# Patient Record
Sex: FEMALE | Race: BLACK OR AFRICAN AMERICAN | NOT HISPANIC OR LATINO | URBAN - METROPOLITAN AREA
[De-identification: names, ages, dates, MRNs, and addresses within clinical notes are randomized per-mention and may not be internally consistent; named-entity substitution may affect disease eponyms.]

---

## 2017-03-09 ENCOUNTER — OUTPATIENT (OUTPATIENT)
Dept: OUTPATIENT SERVICES | Facility: HOSPITAL | Age: 37
LOS: 1 days | End: 2017-03-09

## 2017-03-09 ENCOUNTER — EMERGENCY (EMERGENCY)
Facility: HOSPITAL | Age: 37
LOS: 1 days | Discharge: STILL ADMITTED OR ADMINISTRATIVE | End: 2017-03-09
Admitting: EMERGENCY MEDICINE

## 2017-03-09 VITALS
HEART RATE: 91 BPM | SYSTOLIC BLOOD PRESSURE: 136 MMHG | TEMPERATURE: 98 F | OXYGEN SATURATION: 100 % | RESPIRATION RATE: 16 BRPM | DIASTOLIC BLOOD PRESSURE: 75 MMHG

## 2017-03-09 DIAGNOSIS — Z3A.00 WEEKS OF GESTATION OF PREGNANCY NOT SPECIFIED: ICD-10-CM

## 2017-03-09 DIAGNOSIS — Z98.89 OTHER SPECIFIED POSTPROCEDURAL STATES: Chronic | ICD-10-CM

## 2017-03-09 DIAGNOSIS — O26.899 OTHER SPECIFIED PREGNANCY RELATED CONDITIONS, UNSPECIFIED TRIMESTER: ICD-10-CM

## 2017-05-09 ENCOUNTER — ASOB RESULT (OUTPATIENT)
Age: 37
End: 2017-05-09

## 2017-05-09 ENCOUNTER — APPOINTMENT (OUTPATIENT)
Dept: ANTEPARTUM | Facility: CLINIC | Age: 37
End: 2017-05-09

## 2017-07-02 ENCOUNTER — TRANSCRIPTION ENCOUNTER (OUTPATIENT)
Age: 37
End: 2017-07-02

## 2017-07-02 ENCOUNTER — INPATIENT (INPATIENT)
Facility: HOSPITAL | Age: 37
LOS: 1 days | Discharge: HOME | End: 2017-07-04
Attending: OBSTETRICS & GYNECOLOGY | Admitting: OBSTETRICS & GYNECOLOGY

## 2017-07-02 DIAGNOSIS — Z98.89 OTHER SPECIFIED POSTPROCEDURAL STATES: Chronic | ICD-10-CM

## 2017-07-02 DIAGNOSIS — O26.899 OTHER SPECIFIED PREGNANCY RELATED CONDITIONS, UNSPECIFIED TRIMESTER: ICD-10-CM

## 2017-07-02 DIAGNOSIS — Z3A.00 WEEKS OF GESTATION OF PREGNANCY NOT SPECIFIED: ICD-10-CM

## 2017-07-02 LAB
BASOPHILS # BLD AUTO: 0.03 K/UL — SIGNIFICANT CHANGE UP (ref 0–0.2)
BASOPHILS NFR BLD AUTO: 0.3 % — SIGNIFICANT CHANGE UP (ref 0–2)
BLD GP AB SCN SERPL QL: NEGATIVE — SIGNIFICANT CHANGE UP
EOSINOPHIL # BLD AUTO: 0.13 K/UL — SIGNIFICANT CHANGE UP (ref 0–0.5)
EOSINOPHIL NFR BLD AUTO: 1.1 % — SIGNIFICANT CHANGE UP (ref 0–6)
HCT VFR BLD CALC: 35.2 % — SIGNIFICANT CHANGE UP (ref 34.5–45)
HGB BLD-MCNC: 11.8 G/DL — SIGNIFICANT CHANGE UP (ref 11.5–15.5)
IMM GRANULOCYTES # BLD AUTO: 0.1 # — SIGNIFICANT CHANGE UP
IMM GRANULOCYTES NFR BLD AUTO: 0.9 % — SIGNIFICANT CHANGE UP (ref 0–1.5)
LYMPHOCYTES # BLD AUTO: 2.41 K/UL — SIGNIFICANT CHANGE UP (ref 1–3.3)
LYMPHOCYTES # BLD AUTO: 21.1 % — SIGNIFICANT CHANGE UP (ref 13–44)
MCHC RBC-ENTMCNC: 33.5 % — SIGNIFICANT CHANGE UP (ref 32–36)
MCHC RBC-ENTMCNC: 33.5 PG — SIGNIFICANT CHANGE UP (ref 27–34)
MCV RBC AUTO: 100 FL — SIGNIFICANT CHANGE UP (ref 80–100)
MONOCYTES # BLD AUTO: 0.9 K/UL — SIGNIFICANT CHANGE UP (ref 0–0.9)
MONOCYTES NFR BLD AUTO: 7.9 % — SIGNIFICANT CHANGE UP (ref 2–14)
NEUTROPHILS # BLD AUTO: 7.87 K/UL — HIGH (ref 1.8–7.4)
NEUTROPHILS NFR BLD AUTO: 68.7 % — SIGNIFICANT CHANGE UP (ref 43–77)
NRBC # FLD: 0 — SIGNIFICANT CHANGE UP
PLATELET # BLD AUTO: 194 K/UL — SIGNIFICANT CHANGE UP (ref 150–400)
PMV BLD: 10.5 FL — SIGNIFICANT CHANGE UP (ref 7–13)
RBC # BLD: 3.52 M/UL — LOW (ref 3.8–5.2)
RBC # FLD: 13.3 % — SIGNIFICANT CHANGE UP (ref 10.3–14.5)
RH IG SCN BLD-IMP: POSITIVE — SIGNIFICANT CHANGE UP
WBC # BLD: 11.44 K/UL — HIGH (ref 3.8–10.5)
WBC # FLD AUTO: 11.44 K/UL — HIGH (ref 3.8–10.5)

## 2017-07-02 RX ORDER — OXYCODONE HYDROCHLORIDE 5 MG/1
10 TABLET ORAL
Qty: 0 | Refills: 0 | Status: DISCONTINUED | OUTPATIENT
Start: 2017-07-03 | End: 2017-07-03

## 2017-07-02 RX ORDER — ONDANSETRON 8 MG/1
4 TABLET, FILM COATED ORAL EVERY 6 HOURS
Qty: 0 | Refills: 0 | Status: DISCONTINUED | OUTPATIENT
Start: 2017-07-03 | End: 2017-07-03

## 2017-07-02 RX ORDER — METOCLOPRAMIDE HCL 10 MG
10 TABLET ORAL ONCE
Qty: 0 | Refills: 0 | Status: COMPLETED | OUTPATIENT
Start: 2017-07-02 | End: 2017-07-02

## 2017-07-02 RX ORDER — SODIUM CHLORIDE 9 MG/ML
1000 INJECTION, SOLUTION INTRAVENOUS
Qty: 0 | Refills: 0 | Status: DISCONTINUED | OUTPATIENT
Start: 2017-07-02 | End: 2017-07-02

## 2017-07-02 RX ORDER — NALOXONE HYDROCHLORIDE 4 MG/.1ML
0.1 SPRAY NASAL
Qty: 0 | Refills: 0 | Status: DISCONTINUED | OUTPATIENT
Start: 2017-07-03 | End: 2017-07-03

## 2017-07-02 RX ORDER — BUTORPHANOL TARTRATE 2 MG/ML
0.25 INJECTION, SOLUTION INTRAMUSCULAR; INTRAVENOUS EVERY 6 HOURS
Qty: 0 | Refills: 0 | Status: DISCONTINUED | OUTPATIENT
Start: 2017-07-03 | End: 2017-07-03

## 2017-07-02 RX ORDER — FAMOTIDINE 10 MG/ML
20 INJECTION INTRAVENOUS ONCE
Qty: 0 | Refills: 0 | Status: COMPLETED | OUTPATIENT
Start: 2017-07-02 | End: 2017-07-02

## 2017-07-02 RX ORDER — SODIUM CHLORIDE 9 MG/ML
1000 INJECTION, SOLUTION INTRAVENOUS ONCE
Qty: 0 | Refills: 0 | Status: DISCONTINUED | OUTPATIENT
Start: 2017-07-02 | End: 2017-07-02

## 2017-07-02 RX ORDER — HYDROMORPHONE HYDROCHLORIDE 2 MG/ML
1 INJECTION INTRAMUSCULAR; INTRAVENOUS; SUBCUTANEOUS
Qty: 0 | Refills: 0 | Status: DISCONTINUED | OUTPATIENT
Start: 2017-07-03 | End: 2017-07-03

## 2017-07-02 RX ORDER — CITRIC ACID/SODIUM CITRATE 300-500 MG
30 SOLUTION, ORAL ORAL ONCE
Qty: 0 | Refills: 0 | Status: COMPLETED | OUTPATIENT
Start: 2017-07-02 | End: 2017-07-02

## 2017-07-02 RX ORDER — OXYCODONE HYDROCHLORIDE 5 MG/1
5 TABLET ORAL
Qty: 0 | Refills: 0 | Status: DISCONTINUED | OUTPATIENT
Start: 2017-07-03 | End: 2017-07-03

## 2017-07-02 RX ADMIN — FAMOTIDINE 20 MILLIGRAM(S): 10 INJECTION INTRAVENOUS at 21:04

## 2017-07-02 RX ADMIN — Medication 10 MILLIGRAM(S): at 21:04

## 2017-07-02 RX ADMIN — Medication 30 MILLILITER(S): at 22:25

## 2017-07-03 ENCOUNTER — TRANSCRIPTION ENCOUNTER (OUTPATIENT)
Age: 37
End: 2017-07-03

## 2017-07-03 VITALS
DIASTOLIC BLOOD PRESSURE: 70 MMHG | RESPIRATION RATE: 22 BRPM | HEART RATE: 100 BPM | SYSTOLIC BLOOD PRESSURE: 124 MMHG | OXYGEN SATURATION: 98 %

## 2017-07-03 LAB — T PALLIDUM AB TITR SER: NEGATIVE — SIGNIFICANT CHANGE UP

## 2017-07-03 RX ORDER — DIPHENHYDRAMINE HCL 50 MG
25 CAPSULE ORAL ONCE
Qty: 0 | Refills: 0 | Status: COMPLETED | OUTPATIENT
Start: 2017-07-03 | End: 2017-07-03

## 2017-07-03 RX ORDER — OXYTOCIN 10 UNIT/ML
333.33 VIAL (ML) INJECTION
Qty: 20 | Refills: 0 | Status: DISCONTINUED | OUTPATIENT
Start: 2017-07-03 | End: 2017-07-03

## 2017-07-03 RX ORDER — FERROUS SULFATE 325(65) MG
325 TABLET ORAL DAILY
Qty: 0 | Refills: 0 | Status: DISCONTINUED | OUTPATIENT
Start: 2017-07-03 | End: 2017-07-04

## 2017-07-03 RX ORDER — OXYTOCIN 10 UNIT/ML
41.67 VIAL (ML) INJECTION
Qty: 20 | Refills: 0 | Status: DISCONTINUED | OUTPATIENT
Start: 2017-07-03 | End: 2017-07-03

## 2017-07-03 RX ORDER — OXYCODONE HYDROCHLORIDE 5 MG/1
5 TABLET ORAL EVERY 4 HOURS
Qty: 0 | Refills: 0 | Status: DISCONTINUED | OUTPATIENT
Start: 2017-07-03 | End: 2017-07-04

## 2017-07-03 RX ORDER — LANOLIN
1 OINTMENT (GRAM) TOPICAL
Qty: 0 | Refills: 0 | Status: DISCONTINUED | OUTPATIENT
Start: 2017-07-03 | End: 2017-07-04

## 2017-07-03 RX ORDER — IBUPROFEN 200 MG
600 TABLET ORAL EVERY 6 HOURS
Qty: 0 | Refills: 0 | Status: COMPLETED | OUTPATIENT
Start: 2017-07-03 | End: 2018-06-01

## 2017-07-03 RX ORDER — SIMETHICONE 80 MG/1
80 TABLET, CHEWABLE ORAL EVERY 4 HOURS
Qty: 0 | Refills: 0 | Status: DISCONTINUED | OUTPATIENT
Start: 2017-07-03 | End: 2017-07-04

## 2017-07-03 RX ORDER — OXYTOCIN 10 UNIT/ML
41.67 VIAL (ML) INJECTION
Qty: 20 | Refills: 0 | Status: DISCONTINUED | OUTPATIENT
Start: 2017-07-03 | End: 2017-07-04

## 2017-07-03 RX ORDER — OXYCODONE HYDROCHLORIDE 5 MG/1
5 TABLET ORAL
Qty: 0 | Refills: 0 | Status: COMPLETED | OUTPATIENT
Start: 2017-07-03 | End: 2017-07-10

## 2017-07-03 RX ORDER — OXYCODONE HYDROCHLORIDE 5 MG/1
5 TABLET ORAL
Qty: 0 | Refills: 0 | Status: DISCONTINUED | OUTPATIENT
Start: 2017-07-03 | End: 2017-07-04

## 2017-07-03 RX ORDER — KETOROLAC TROMETHAMINE 30 MG/ML
30 SYRINGE (ML) INJECTION EVERY 6 HOURS
Qty: 0 | Refills: 0 | Status: DISCONTINUED | OUTPATIENT
Start: 2017-07-03 | End: 2017-07-03

## 2017-07-03 RX ORDER — DIPHENHYDRAMINE HCL 50 MG
25 CAPSULE ORAL EVERY 6 HOURS
Qty: 0 | Refills: 0 | Status: DISCONTINUED | OUTPATIENT
Start: 2017-07-03 | End: 2017-07-04

## 2017-07-03 RX ORDER — TETANUS TOXOID, REDUCED DIPHTHERIA TOXOID AND ACELLULAR PERTUSSIS VACCINE, ADSORBED 5; 2.5; 8; 8; 2.5 [IU]/.5ML; [IU]/.5ML; UG/.5ML; UG/.5ML; UG/.5ML
0.5 SUSPENSION INTRAMUSCULAR ONCE
Qty: 0 | Refills: 0 | Status: DISCONTINUED | OUTPATIENT
Start: 2017-07-03 | End: 2017-07-04

## 2017-07-03 RX ORDER — ACETAMINOPHEN 500 MG
975 TABLET ORAL EVERY 6 HOURS
Qty: 0 | Refills: 0 | Status: DISCONTINUED | OUTPATIENT
Start: 2017-07-03 | End: 2017-07-04

## 2017-07-03 RX ORDER — DOCUSATE SODIUM 100 MG
100 CAPSULE ORAL
Qty: 0 | Refills: 0 | Status: DISCONTINUED | OUTPATIENT
Start: 2017-07-03 | End: 2017-07-04

## 2017-07-03 RX ORDER — SODIUM CHLORIDE 9 MG/ML
1000 INJECTION, SOLUTION INTRAVENOUS
Qty: 0 | Refills: 0 | Status: DISCONTINUED | OUTPATIENT
Start: 2017-07-03 | End: 2017-07-03

## 2017-07-03 RX ORDER — HEPARIN SODIUM 5000 [USP'U]/ML
5000 INJECTION INTRAVENOUS; SUBCUTANEOUS EVERY 12 HOURS
Qty: 0 | Refills: 0 | Status: DISCONTINUED | OUTPATIENT
Start: 2017-07-03 | End: 2017-07-04

## 2017-07-03 RX ORDER — SODIUM CHLORIDE 9 MG/ML
1000 INJECTION, SOLUTION INTRAVENOUS
Qty: 0 | Refills: 0 | Status: DISCONTINUED | OUTPATIENT
Start: 2017-07-03 | End: 2017-07-04

## 2017-07-03 RX ORDER — GLYCERIN ADULT
1 SUPPOSITORY, RECTAL RECTAL AT BEDTIME
Qty: 0 | Refills: 0 | Status: DISCONTINUED | OUTPATIENT
Start: 2017-07-03 | End: 2017-07-04

## 2017-07-03 RX ORDER — IBUPROFEN 200 MG
600 TABLET ORAL EVERY 6 HOURS
Qty: 0 | Refills: 0 | Status: DISCONTINUED | OUTPATIENT
Start: 2017-07-03 | End: 2017-07-04

## 2017-07-03 RX ORDER — OXYCODONE HYDROCHLORIDE 5 MG/1
5 TABLET ORAL EVERY 4 HOURS
Qty: 0 | Refills: 0 | Status: COMPLETED | OUTPATIENT
Start: 2017-07-03 | End: 2017-07-10

## 2017-07-03 RX ORDER — HYDROCORTISONE 1 %
1 OINTMENT (GRAM) TOPICAL ONCE
Qty: 0 | Refills: 0 | Status: COMPLETED | OUTPATIENT
Start: 2017-07-03 | End: 2017-07-04

## 2017-07-03 RX ADMIN — Medication 975 MILLIGRAM(S): at 14:09

## 2017-07-03 RX ADMIN — Medication 30 MILLIGRAM(S): at 07:26

## 2017-07-03 RX ADMIN — Medication 30 MILLIGRAM(S): at 07:11

## 2017-07-03 RX ADMIN — Medication 30 MILLIGRAM(S): at 14:39

## 2017-07-03 RX ADMIN — Medication 325 MILLIGRAM(S): at 14:09

## 2017-07-03 RX ADMIN — HEPARIN SODIUM 5000 UNIT(S): 5000 INJECTION INTRAVENOUS; SUBCUTANEOUS at 07:11

## 2017-07-03 RX ADMIN — Medication 25 MILLIGRAM(S): at 14:09

## 2017-07-03 RX ADMIN — Medication 1 TABLET(S): at 14:09

## 2017-07-03 RX ADMIN — Medication 25 MILLIGRAM(S): at 07:07

## 2017-07-03 RX ADMIN — Medication 975 MILLIGRAM(S): at 07:05

## 2017-07-03 RX ADMIN — Medication 100 MILLIGRAM(S): at 14:09

## 2017-07-03 RX ADMIN — Medication 30 MILLIGRAM(S): at 21:42

## 2017-07-03 RX ADMIN — Medication 975 MILLIGRAM(S): at 14:39

## 2017-07-03 RX ADMIN — Medication 30 MILLIGRAM(S): at 14:09

## 2017-07-03 RX ADMIN — Medication 25 MILLIGRAM(S): at 21:26

## 2017-07-03 RX ADMIN — Medication 975 MILLIGRAM(S): at 21:12

## 2017-07-03 RX ADMIN — Medication 30 MILLIGRAM(S): at 21:13

## 2017-07-03 RX ADMIN — Medication 125 MILLIUNIT(S)/MIN: at 02:41

## 2017-07-03 RX ADMIN — HEPARIN SODIUM 5000 UNIT(S): 5000 INJECTION INTRAVENOUS; SUBCUTANEOUS at 18:09

## 2017-07-03 RX ADMIN — Medication 25 MILLIGRAM(S): at 03:08

## 2017-07-03 RX ADMIN — Medication 975 MILLIGRAM(S): at 07:35

## 2017-07-03 RX ADMIN — Medication 975 MILLIGRAM(S): at 21:42

## 2017-07-03 NOTE — DISCHARGE NOTE OB - CARE PLAN
Principal Discharge DX:	Labor and delivery, indication for care  Goal:	routine  Instructions for follow-up, activity and diet:	routine Principal Discharge DX:	Labor and delivery, indication for care  Goal:	routine  Instructions for follow-up, activity and diet:	routine  Secondary Diagnosis:	S/P  section  Secondary Diagnosis:	Gestational diabetes mellitus (GDM)  Secondary Diagnosis:	Premature labor

## 2017-07-03 NOTE — DISCHARGE NOTE OB - HOSPITAL COURSE
lft c section lft c section Repeat in labor  scheduled next week .GDM diet control Limite compliance

## 2017-07-03 NOTE — DISCHARGE NOTE OB - PATIENT PORTAL LINK FT
“You can access the [employer] Patient Portal, offered by [facility], by registering with the following website: [email or web address]”

## 2017-07-03 NOTE — DISCHARGE NOTE OB - CARE PROVIDER_API CALL
Soni Hebert), Obstetrics and Gynecology  [address] [place] [address]  Phone: [phone]  Fax: [phone]

## 2017-07-03 NOTE — PROGRESS NOTE ADULT - SUBJECTIVE AND OBJECTIVE BOX
Postop Day  __1_ s/p   C- Section    THERAPY:  [ X] Spinal morphine   [  ] Epidural morphine   [  ] IV PCA Hydromorphone 1 mg/ml      Sedation Score:	  [ X ] Alert	    [  ] Drowsy        [  ] Arousable	[  ] Asleep	[  ] Unresponsive    Side Effects:	  [  X] None	     [  ] Nausea        [  ] Pruritus        [  ] Weakness   [  ] Numbness        ASSESSMENT/ PLAN   [   ] Discontinue         [  ] Continue   [X] Change to po prn pain meds  [ x ]Documentation and Verification of current medications

## 2017-07-03 NOTE — DISCHARGE NOTE OB - MATERIALS PROVIDED
Breastfeeding Log/  Immunization Record/[facility] Strong Screening Program/Vaccinations

## 2017-07-04 VITALS
RESPIRATION RATE: 18 BRPM | HEART RATE: 96 BPM | SYSTOLIC BLOOD PRESSURE: 116 MMHG | OXYGEN SATURATION: 100 % | DIASTOLIC BLOOD PRESSURE: 75 MMHG | TEMPERATURE: 99 F

## 2017-07-04 DIAGNOSIS — Z98.891 HISTORY OF UTERINE SCAR FROM PREVIOUS SURGERY: ICD-10-CM

## 2017-07-04 DIAGNOSIS — O24.419 GESTATIONAL DIABETES MELLITUS IN PREGNANCY, UNSPECIFIED CONTROL: ICD-10-CM

## 2017-07-04 RX ADMIN — Medication 1 TABLET(S): at 14:07

## 2017-07-04 RX ADMIN — Medication 975 MILLIGRAM(S): at 03:14

## 2017-07-04 RX ADMIN — Medication 975 MILLIGRAM(S): at 02:44

## 2017-07-04 RX ADMIN — HEPARIN SODIUM 5000 UNIT(S): 5000 INJECTION INTRAVENOUS; SUBCUTANEOUS at 07:44

## 2017-07-04 RX ADMIN — OXYCODONE HYDROCHLORIDE 5 MILLIGRAM(S): 5 TABLET ORAL at 14:50

## 2017-07-04 RX ADMIN — Medication 600 MILLIGRAM(S): at 02:39

## 2017-07-04 RX ADMIN — Medication 1 APPLICATION(S): at 01:16

## 2017-07-04 RX ADMIN — Medication 600 MILLIGRAM(S): at 03:14

## 2017-07-04 RX ADMIN — Medication 325 MILLIGRAM(S): at 14:08

## 2017-07-04 RX ADMIN — OXYCODONE HYDROCHLORIDE 5 MILLIGRAM(S): 5 TABLET ORAL at 14:07

## 2017-07-04 NOTE — PROGRESS NOTE ADULT - SUBJECTIVE AND OBJECTIVE BOX
Post Op C/S      Pt without comlaints  requesting discharge today  vital signs stable      Abdomen soft  fundus firm  Incision clean dry and intact  extremities non tender    lochia wnl      Patient doing well  Routine post operative care  discharge home Post Op C/S      Pt without comlaints  requesting discharge today  vital signs stable      Abdomen soft  fundus firm  Incision clean dry and intact  extremities non tender    lochia wnl      Patient doing well  Routine post operative care  discharge home  Patient request early discharge home seen by Dr Amin

## 2017-07-04 NOTE — PROGRESS NOTE ADULT - SUBJECTIVE AND OBJECTIVE BOX
JAI BERRY  MRN #[id number]      SUBJECTIVE:  Pain: Controlled  Complaints: None.      MILESTONES:   Alert and oriented x 3  [X  ]   Out of bed /ambulating [X  ]     Postive Flatus [ X  ]   Negative Flatus  [  ]     Positive  Bowel Movement [   ]   Negative Bowel Movement[X   ]     Voiding [X   ]    Due to void  [   ]   Crook/Indwelling catheter in place[   ]    Tolerating a regular diet  [ X  ]    Infant feeding:   Breast  [  ]    Bottle  [   ]   Both [ X ]      Feeding related issues and/or concerns: none    OBJECTIVE:  T(C): 37 (17 @ 05:35), Max: 37.2 (17 @ 10:00)  HR: 96 (17 @ 05:35) (89 - 99)  BP: 116/75 (17 @ 05:35) (113/73 - 125/88)  RR: 18 (17 @ 05:35) (18 - 19)  SpO2: 100% (17 @ 05:35) (98% - 100%)  Wt(kg): --    LABS:                          11.8   11.44 )-----------( 194      ( 2017 20:45 )             35.2       Blood Type: A Positive    Treponema Pallidum Antibody Interpretation: Negative ( @ 20:45)        ASSESSMENT:   37y/o   Post-operative Day#2      Post-operative delivery for Primary [   ]   Repeat  [ X  ]  Low Transverse  Section                                     Indication of procedure: previous   Condition: Stable  PAST MEDICAL & SURGICAL HISTORY:  No pertinent past medical history  History of appendectomy: &#x27;s  S/P  section:       Current Issues:    Breasts: Soft [ X ]    Engorged [  ]  Abdomen: Soft [X  ]  Nontender [ X ]  Nondistended [  ]   Distended [ X ] due to gas                     Fundus firm [X  ]    Fundus boggy [   ]   Bowel sounds present [  ]     Bowel sounds absent  [  ]     Incisional:  Subcutaneous closure  [X   ]    Staples  [   ]     Erythema [   ]   Clean/Dry/Intact [X   ]     Ecchymosis [   ]     Dermabond [   ]  Steristrips [X  ]  Vagina: Lochia  Mod [X  ]    Scant [  ]    Heavy [  ]  Lower extremities: Edema[X  ]    Noted bilaterally [X  ]       Non-tender calves [X  ]      Negative Danielle's sign [ X ]    Positive pedal pulses [X  ]    Other relevant physical exam findings:  Dressing was removed today.  Steristrips clean, dry, and intact.      Plan: Increase ambulation, analgesia PRN and pain medication protocol standing oxycodone, ibuprofen and acetaminophen.  Diet: Regular diet    Orders:    Continue routine post-operative and postpartum care.     Discharge Planning [ X ] Patient desires to go home today.  Consults :  Social Work [  ]     Lactation [  ]    Other [ ]

## 2020-06-11 NOTE — ED ADULT TRIAGE NOTE - SPO2 (%)
100 Prednisone Counseling:  I discussed with the patient the risks of prolonged use of prednisone including but not limited to weight gain, insomnia, osteoporosis, mood changes, diabetes, susceptibility to infection, glaucoma and high blood pressure.  In cases where prednisone use is prolonged, patients should be monitored with blood pressure checks, serum glucose levels and an eye exam.  Additionally, the patient may need to be placed on GI prophylaxis, PCP prophylaxis, and calcium and vitamin D supplementation and/or a bisphosphonate.  The patient verbalized understanding of the proper use and the possible adverse effects of prednisone.  All of the patient's questions and concerns were addressed.

## 2021-04-06 NOTE — PATIENT PROFILE OB - AS SC BRADEN SENSORY
Case Management Initial Discharge Plan  Jesse Cardenas             Met with:patient to discuss discharge plans. Information verified: address, contacts, phone number, , insurance Yes    Emergency Contact/Next of Kin name & number: Katya Garrison [phone]    PCP: Juliane Levi MD  Date of last visit: 1 week ago     Insurance Provider:  [employer]       Discharge Planning    Living Arrangements:  Alone   Support Systems:  Family Members, Friends/Neighbors    Home has 1 stories  0 stairs to climb to get into front door, na stairs to climb to reach second floor  Location of bedroom/bathroom in home main level     Patient able to perform ADL's:Assisted    Current Services (outpatient & in home) nurse 5 days per week and aid services 5 days per week through [employer]   DME equipment: [employer], walker, Lift chair, shower chair   DME provider: [facility] [employer]    Receiving oral anticoagulation therapy? Plavix     If indicated:   Physician managing anticoagulation treatment: cardiology   Where does patient obtain lab work for ATC treatment? na      Potential Assistance Needed:       Patient agreeable to home care: Yes  Freedom of choice provided:  yes, current with [facility]     Prior SNF/Rehab Placement and Facility: none   Agreeable to SNF/Rehab: No  Fisher of choice provided: n/a     Evaluation: no    Expected Discharge date:       Patient expects to be discharged to:  Home and [facility]  Follow Up Appointment: Best Day/ Time:      Transportation provider: family   Transportation arrangements needed for discharge: No    Readmission Risk              Risk of Unplanned Readmission:        0             Does patient have a readmission risk score greater than 14?: No  If yes, follow-up appointment must be made within 7 days of discharge.        Discharge Plan: [facility]           Electronically signed by Paulette Escobar RN on 21 at 5:25 PM EDT (4) no impairment

## 2022-01-08 ENCOUNTER — APPOINTMENT (OUTPATIENT)
Dept: MAMMOGRAPHY | Facility: CLINIC | Age: 42
End: 2022-01-08
Payer: COMMERCIAL

## 2022-01-08 PROCEDURE — 77063 BREAST TOMOSYNTHESIS BI: CPT

## 2022-01-08 PROCEDURE — 77067 SCR MAMMO BI INCL CAD: CPT

## 2022-07-16 NOTE — PATIENT PROFILE OB - LIMIT VISITORS, INFANT PROFILE
Encounter Date: 7/16/2022       History     Chief Complaint   Patient presents with    COVID-19 Concerns     Wants pt restested for covid. Tested positive 5 days ago. All symptoms resolved.      Michelle Sanchez is a 7 m.o. female presents with mom related to child is cared for by her grandmother and grandmother would like excuse or retesting of child. Mom is fine with no retesting. But would like release to go to stay with grandmother since infant is not symptomatic. Child had cough and slight rhinorrhea x 3 days. Over past 2 days no cough, no rhinorrhea. No fever. Eating and drinking normal. Normal wet diapers. Normal activity.         Review of patient's allergies indicates:  No Known Allergies  History reviewed. No pertinent past medical history.  History reviewed. No pertinent surgical history.  Family History   Problem Relation Age of Onset    Hypertension Mother         Copied from mother's history at birth     Social History     Tobacco Use    Smoking status: Never Smoker    Smokeless tobacco: Never Used   Substance Use Topics    Alcohol use: Never    Drug use: Never     Review of Systems   Constitutional: Negative.  Negative for fever.   HENT: Negative.  Negative for trouble swallowing.    Eyes: Negative.    Respiratory: Negative.  Negative for cough.    Cardiovascular: Negative.  Negative for cyanosis.   Gastrointestinal: Negative.  Negative for vomiting.   Genitourinary: Negative.  Negative for decreased urine volume.   Musculoskeletal: Negative.  Negative for extremity weakness.   Skin: Negative for color change, pallor, rash and wound.   Allergic/Immunologic: Negative for food allergies and immunocompromised state.   Neurological: Negative for seizures and facial asymmetry.   Hematological: Does not bruise/bleed easily.       Physical Exam     Initial Vitals [07/16/22 1512]   BP Pulse Resp Temp SpO2   -- 127 30 97.3 °F (36.3 °C) 98 %      MAP       --         Physical Exam    Nursing note and  vitals reviewed.  Constitutional: Vital signs are normal. She appears well-developed and well-nourished. She is active and playful. She is smiling. She does not have a sickly appearance. She does not appear ill. No distress.   HENT:   Head: No cranial deformity or facial anomaly.   Right Ear: Tympanic membrane normal.   Left Ear: Tympanic membrane normal.   Nose: Nose normal. No nasal discharge.   Mouth/Throat: Oropharynx is clear. Pharynx is normal.   Eyes: Conjunctivae are normal.   Neck: Neck supple.   Normal range of motion.  Cardiovascular: Normal rate and regular rhythm.   Pulmonary/Chest: Effort normal and breath sounds normal.   Abdominal: Abdomen is soft. Bowel sounds are normal.   Musculoskeletal:         General: Normal range of motion.      Cervical back: Normal range of motion and neck supple.     Neurological: She is alert.   Skin: Skin is warm and dry. Capillary refill takes less than 2 seconds. Turgor is normal.         ED Course   Procedures  Labs Reviewed - No data to display       Imaging Results    None          Medications - No data to display              ED Course as of 07/16/22 1532   Sat Jul 16, 2022   1531 Advised mom no retesting is indicated in ED. Can stay positive for undetermined amount of time. Child is no longer symptomatic. Mom request /school excuse to return grandmother 7/17/2022.  [CG]      ED Course User Index  [CG] Monica Rush NP             Clinical Impression:   Final diagnoses:  [U07.1] COVID-19 virus detected - 7/11/2022 (Primary)          ED Disposition Condition    Discharge Stable        ED Prescriptions     None        Follow-up Information     Follow up With Specialties Details Why Contact Info    Valencia Good MD Pediatrics In 2 days As needed [id number]  [place] [address] [phone]      [facility] - Emergency Dept Emergency Medicine  If symptoms worsen [id number] [facility] [address]  [facility] [address] [phone]           Monica STEINER  VITO Rush  07/16/22 1535     no

## 2023-03-11 ENCOUNTER — APPOINTMENT (OUTPATIENT)
Dept: MAMMOGRAPHY | Facility: CLINIC | Age: 43
End: 2023-03-11
Payer: COMMERCIAL

## 2023-03-11 PROCEDURE — 77063 BREAST TOMOSYNTHESIS BI: CPT

## 2023-03-11 PROCEDURE — 77067 SCR MAMMO BI INCL CAD: CPT
